# Patient Record
Sex: FEMALE | Race: ASIAN | HISPANIC OR LATINO | URBAN - METROPOLITAN AREA
[De-identification: names, ages, dates, MRNs, and addresses within clinical notes are randomized per-mention and may not be internally consistent; named-entity substitution may affect disease eponyms.]

---

## 2017-01-04 ENCOUNTER — APPOINTMENT (OUTPATIENT)
Age: 32
Setting detail: DERMATOLOGY
End: 2017-01-05

## 2017-01-04 DIAGNOSIS — L72.8 OTHER FOLLICULAR CYSTS OF THE SKIN AND SUBCUTANEOUS TISSUE: ICD-10-CM

## 2017-01-04 DIAGNOSIS — K13.0 DISEASES OF LIPS: ICD-10-CM

## 2017-01-04 DIAGNOSIS — L70.0 ACNE VULGARIS: ICD-10-CM

## 2017-01-04 DIAGNOSIS — Z79.899 OTHER LONG TERM (CURRENT) DRUG THERAPY: ICD-10-CM

## 2017-01-04 PROCEDURE — OTHER PRESCRIPTION: OTHER

## 2017-01-04 PROCEDURE — OTHER COUNSELING: ISOTRETINOIN: OTHER

## 2017-01-04 PROCEDURE — OTHER ISOTRETINOIN MONITORING: OTHER

## 2017-01-04 PROCEDURE — OTHER HIGH RISK MEDICATION MONITORING: OTHER

## 2017-01-04 PROCEDURE — OTHER COUNSELING: OTHER

## 2017-01-04 PROCEDURE — OTHER OTHER: OTHER

## 2017-01-04 PROCEDURE — 99214 OFFICE O/P EST MOD 30 MIN: CPT

## 2017-01-04 PROCEDURE — OTHER TREATMENT REGIMEN: OTHER

## 2017-01-04 RX ORDER — ISOTRETINOIN 20 MG/1
CAPSULE, GELATIN COATED ORAL
Qty: 60 | Refills: 0 | Status: ERX

## 2017-01-04 ASSESSMENT — LOCATION DETAILED DESCRIPTION DERM
LOCATION DETAILED: LEFT SUPERIOR VERMILION LIP
LOCATION DETAILED: LEFT LATERAL SUPERIOR CHEST
LOCATION DETAILED: LEFT MEDIAL MALAR CHEEK

## 2017-01-04 ASSESSMENT — LOCATION SIMPLE DESCRIPTION DERM
LOCATION SIMPLE: LEFT CHEEK
LOCATION SIMPLE: CHEST
LOCATION SIMPLE: LEFT LIP

## 2017-01-04 ASSESSMENT — LOCATION ZONE DERM
LOCATION ZONE: TRUNK
LOCATION ZONE: FACE
LOCATION ZONE: LIP

## 2017-01-04 NOTE — PROCEDURE: OTHER
Detail Level: Simple
Note Text (......Xxx Chief Complaint.): This diagnosis correlates with the
Other (Free Text): Patient just got her labs done today. Will need to receive labs and confirm her in the [facility] system before the RX can be sent over to the pharmacy. Patient is aware.\\n\\nLabs received 1/5/17. Patient confirmed in [employer] and Rx sent 1/5/17.

## 2017-01-04 NOTE — HPI: MEDICATION (ACCUTANE)
How Severe Is It?: mild
Is This A New Presentation, Or A Follow-Up?: Follow Up Accutane
When Was Your Last Visit?: 11/30/16
Females Only: When Was Your Last Menstrual Period?: 12/21/16

## 2017-01-04 NOTE — PROCEDURE: ISOTRETINOIN MONITORING
Display Individual Monthly Dosage In The Note (If Yes Will Display All Dosages Which Are Not N/A): yes

## 2017-01-31 ENCOUNTER — APPOINTMENT (OUTPATIENT)
Age: 32
Setting detail: DERMATOLOGY
End: 2017-02-01

## 2017-01-31 DIAGNOSIS — Z79.899 OTHER LONG TERM (CURRENT) DRUG THERAPY: ICD-10-CM

## 2017-01-31 DIAGNOSIS — K13.0 DISEASES OF LIPS: ICD-10-CM

## 2017-01-31 DIAGNOSIS — L70.0 ACNE VULGARIS: ICD-10-CM

## 2017-01-31 DIAGNOSIS — L72.8 OTHER FOLLICULAR CYSTS OF THE SKIN AND SUBCUTANEOUS TISSUE: ICD-10-CM

## 2017-01-31 PROCEDURE — 99214 OFFICE O/P EST MOD 30 MIN: CPT

## 2017-01-31 PROCEDURE — OTHER TREATMENT REGIMEN: OTHER

## 2017-01-31 PROCEDURE — OTHER HIGH RISK MEDICATION MONITORING: OTHER

## 2017-01-31 PROCEDURE — OTHER PRESCRIPTION: OTHER

## 2017-01-31 PROCEDURE — OTHER COUNSELING: OTHER

## 2017-01-31 PROCEDURE — OTHER OTHER: OTHER

## 2017-01-31 PROCEDURE — OTHER ISOTRETINOIN MONITORING: OTHER

## 2017-01-31 RX ORDER — ISOTRETINOIN 20 MG/1
CAPSULE, GELATIN COATED ORAL
Qty: 60 | Refills: 0 | Status: ERX

## 2017-01-31 ASSESSMENT — LOCATION SIMPLE DESCRIPTION DERM
LOCATION SIMPLE: CHEST
LOCATION SIMPLE: LEFT LIP
LOCATION SIMPLE: LEFT CHEEK
LOCATION SIMPLE: LEFT BREAST

## 2017-01-31 ASSESSMENT — LOCATION ZONE DERM
LOCATION ZONE: LIP
LOCATION ZONE: FACE
LOCATION ZONE: TRUNK

## 2017-01-31 ASSESSMENT — LOCATION DETAILED DESCRIPTION DERM
LOCATION DETAILED: LEFT MEDIAL MALAR CHEEK
LOCATION DETAILED: LEFT SUPERIOR VERMILION LIP
LOCATION DETAILED: LOWER STERNUM
LOCATION DETAILED: LEFT MEDIAL BREAST 11-12:00 REGION

## 2017-01-31 NOTE — HPI: MEDICATION (ACCUTANE)
How Severe Is It?: mild
Is This A New Presentation, Or A Follow-Up?: Follow Up Accutane
When Was Your Last Visit?: 01/04/17
Females Only: When Was Your Last Menstrual Period?: 01/12/17

## 2017-01-31 NOTE — PROCEDURE: OTHER
Other (Free Text): Identification Number:  [id number]\\nFirst Name:  Camila\\nLast Name:  Inés\\nDate of Birth:  09/27/1985\\nConfirm Patient Counseling is Complete.\\nThe patient's Program Status is Required to Demonstrate Comprehension.\\nThe patient must answer her Comprehension questions, and then may have her prescription filled before 11:59 PM Eastern Time on 2/5/2017.\\n Other (Free Text): Identification Number:  [id number]\\nFirst Name:  Camila\\nLast Name:  Inés\\nDate of Birth:  09/27/1985\\nConfirm Patient Counseling is Complete.\\nThe patient's Program Status is Required to Demonstrate Comprehension.\\nThe patient must answer her Comprehension questions, and then may have her prescription filled before 11:59 PM Eastern Time on 2/5/2017.\\n

## 2017-02-16 ENCOUNTER — RX ONLY (RX ONLY)
Age: 32
End: 2017-02-16

## 2017-02-16 RX ORDER — IODOQUINOL, HYDROCORTISONE ACETATE AND ALOE VERA LEAF 10; 20; 10 MG/G; MG/G; MG/G
1% GEL TOPICAL
Qty: 1 | Refills: 2 | Status: ERX | COMMUNITY
Start: 2017-02-16

## 2017-02-28 ENCOUNTER — APPOINTMENT (OUTPATIENT)
Age: 32
Setting detail: DERMATOLOGY
End: 2017-03-02

## 2017-02-28 DIAGNOSIS — L70.0 ACNE VULGARIS: ICD-10-CM

## 2017-02-28 DIAGNOSIS — Z79.899 OTHER LONG TERM (CURRENT) DRUG THERAPY: ICD-10-CM

## 2017-02-28 DIAGNOSIS — K13.0 DISEASES OF LIPS: ICD-10-CM

## 2017-02-28 PROCEDURE — OTHER HIGH RISK MEDICATION MONITORING: OTHER

## 2017-02-28 PROCEDURE — 99214 OFFICE O/P EST MOD 30 MIN: CPT

## 2017-02-28 PROCEDURE — OTHER COUNSELING: OTHER

## 2017-02-28 PROCEDURE — OTHER OTHER: OTHER

## 2017-02-28 PROCEDURE — OTHER ISOTRETINOIN MONITORING: OTHER

## 2017-02-28 PROCEDURE — OTHER TREATMENT REGIMEN: OTHER

## 2017-02-28 PROCEDURE — OTHER ORDER TESTS: OTHER

## 2017-02-28 PROCEDURE — OTHER PRESCRIPTION: OTHER

## 2017-02-28 RX ORDER — ISOTRETINOIN 20 MG/1
CAPSULE, GELATIN COATED ORAL
Qty: 60 | Refills: 0 | Status: ERX

## 2017-02-28 ASSESSMENT — LOCATION SIMPLE DESCRIPTION DERM
LOCATION SIMPLE: RIGHT LIP
LOCATION SIMPLE: LEFT CHEEK

## 2017-02-28 ASSESSMENT — LOCATION DETAILED DESCRIPTION DERM
LOCATION DETAILED: LEFT INFERIOR CENTRAL MALAR CHEEK
LOCATION DETAILED: RIGHT INFERIOR VERMILION LIP

## 2017-02-28 ASSESSMENT — LOCATION ZONE DERM
LOCATION ZONE: LIP
LOCATION ZONE: FACE

## 2017-02-28 NOTE — HPI: MEDICATION (ACCUTANE)
How Severe Is It?: mild
Is This A New Presentation, Or A Follow-Up?: Follow Up Accutane
Females Only: When Was Your Last Menstrual Period?: 02/20/2017

## 2017-02-28 NOTE — PROCEDURE: TREATMENT REGIMEN
Initiate Treatment: Alcortin A gel BID prn
Detail Level: Zone
Plan: Patient has Rx for Alcortin A and will get it filled. She was away when the mail order pharmacy tried to deliver it and so it was sent back. She will request it be sent again
Samples Given: Alcortin A gel
Otc Regimen: Continue Aquaphor ointment

## 2017-02-28 NOTE — PROCEDURE: OTHER
Other (Free Text): Discussed patient's side effect of recent moodiness that was divulged in the intake (chief complaint) encounter with my MA, Ibeth Oleary. She stated she thinks it is just related to her menstrual cycle and she denies feeling depressed. Patient is wanting to continue Isotretinoin to the Higher end of the therapeutic range if possible. She is on her 2nd course of Isotretinoin and understands that I wouldn't recommend nor prescribe her a third course. Once her labs come in (she just went this morning) if they are ok then we can do one more month of the Zenatane 20mf one po BID.  Will need to renew her standing order for her to get her labs in 1 month if she continues for one more month.\\n\\nIdentification Number:  [id number]\\nFirst Name:  Camila\\nLast Name:  Inés\\nDate of Birth:  09/27/1985\\nConfirm Patient Counseling is Complete.\\nThe patient's Program Status is Required to Demonstrate Comprehension.\\nThe patient must answer her Comprehension questions, and then may have her prescription filled before 11:59 PM Eastern Time on 3/6/2017.\\n Other (Free Text): Discussed patient's side effect of recent moodiness that was divulged in the intake (chief complaint) encounter with my MA, Ibeth Oleary. She stated she thinks it is just related to her menstrual cycle and she denies feeling depressed. Patient is wanting to continue Isotretinoin to the Higher end of the therapeutic range if possible. She is on her 2nd course of Isotretinoin and understands that I wouldn't recommend nor prescribe her a third course. Once her labs come in (she just went this morning) if they are ok then we can do one more month of the Zenatane 20mf one po BID.  Will need to renew her standing order for her to get her labs in 1 month if she continues for one more month.\\n\\nIdentification Number:  [id number]\\nFirst Name:  Camila\\nLast Name:  Inés\\nDate of Birth:  09/27/1985\\nConfirm Patient Counseling is Complete.\\nThe patient's Program Status is Required to Demonstrate Comprehension.\\nThe patient must answer her Comprehension questions, and then may have her prescription filled before 11:59 PM Eastern Time on 3/6/2017.\\n